# Patient Record
Sex: FEMALE | Race: WHITE
[De-identification: names, ages, dates, MRNs, and addresses within clinical notes are randomized per-mention and may not be internally consistent; named-entity substitution may affect disease eponyms.]

---

## 2018-06-13 ENCOUNTER — HOSPITAL ENCOUNTER (EMERGENCY)
Dept: HOSPITAL 58 - ED | Age: 58
LOS: 1 days | Discharge: HOME | End: 2018-06-14

## 2018-06-13 VITALS — BODY MASS INDEX: 19.5 KG/M2

## 2018-06-13 DIAGNOSIS — R10.84: ICD-10-CM

## 2018-06-13 DIAGNOSIS — K59.00: Primary | ICD-10-CM

## 2018-06-13 DIAGNOSIS — F17.210: ICD-10-CM

## 2018-06-13 PROCEDURE — 82550 ASSAY OF CK (CPK): CPT

## 2018-06-13 PROCEDURE — 85025 COMPLETE CBC W/AUTO DIFF WBC: CPT

## 2018-06-13 PROCEDURE — 83690 ASSAY OF LIPASE: CPT

## 2018-06-13 PROCEDURE — 81001 URINALYSIS AUTO W/SCOPE: CPT

## 2018-06-13 PROCEDURE — 87086 URINE CULTURE/COLONY COUNT: CPT

## 2018-06-13 PROCEDURE — 80053 COMPREHEN METABOLIC PANEL: CPT

## 2018-06-13 PROCEDURE — 93010 ELECTROCARDIOGRAM REPORT: CPT

## 2018-06-13 PROCEDURE — 99283 EMERGENCY DEPT VISIT LOW MDM: CPT

## 2018-06-13 PROCEDURE — 84484 ASSAY OF TROPONIN QUANT: CPT

## 2018-06-13 PROCEDURE — 96375 TX/PRO/DX INJ NEW DRUG ADDON: CPT

## 2018-06-13 PROCEDURE — 85651 RBC SED RATE NONAUTOMATED: CPT

## 2018-06-13 PROCEDURE — 93005 ELECTROCARDIOGRAM TRACING: CPT

## 2018-06-13 PROCEDURE — 96374 THER/PROPH/DIAG INJ IV PUSH: CPT

## 2018-06-13 PROCEDURE — 36415 COLL VENOUS BLD VENIPUNCTURE: CPT

## 2018-06-13 PROCEDURE — 82150 ASSAY OF AMYLASE: CPT

## 2018-06-13 RX ADMIN — Medication PRN SYR: at 23:52

## 2018-06-14 VITALS — SYSTOLIC BLOOD PRESSURE: 194 MMHG | DIASTOLIC BLOOD PRESSURE: 74 MMHG | TEMPERATURE: 97.6 F

## 2018-06-14 RX ADMIN — Medication PRN SYR: at 00:05

## 2018-06-14 RX ADMIN — Medication PRN SYR: at 00:06

## 2018-06-14 NOTE — CT
EXAM:  CT scan abdomen pelvis without contrast 

  

HISTORY:  Abdominal pain 

  

COMPARISON:  CT scan abdomen pelvis with runoff 01/28/2014 

  

FINDINGS:  Contiguous axial images obtained from lung bases to the symphysis pubis without contrast u
tilizing 3-mm collimation.  Sagittal and coronal reconstructions were imaged and reviewed. Calcified 
granuloma at the left lung base.  The gallbladder is fluid filled without cholelithiasis.  Benign gra
nulomatous changes are seen within the liver and spleen.  Extensive atherosclerotic changes are seen 
involving the abdominal aorta.  There  is an aortobifemoral graft.  There is also a fem-fem graft. Th
ere are nonobstructive punctate renal calculi.  There is colonic fecal stasis without obstruction .. 
 There is anterolisthesis of L4/L5 with marked degenerate disc disease and endplate degenerative carranza
ges. 

  

IMPRESSION: 

  

Nonobstructive bilateral nephrolithiasis. 

  

Colonic fecal stasis without obstruction or free fluid. 

  

ASVD.

## 2018-06-14 NOTE — ED.PDOC
General


ED Provider: 


Dr. LAYO BONILLA-ER





Chief Complaint: Abdominal Pain


Stated Complaint: im hurting--i think i have constipation


Time Seen by Physician: 23:30


Mode of Arrival: Wheelchair


Information Source: Patient, Family


Exam Limitations: No limitations


Primary Care Provider: 


MIREYA FUNK





Nursing and Triage Documentation Reviewed and Agree: Yes


Reviewed sepsis parameters & appropriate labs ordered?: Yes


System Inflammatory Response Syndrome: Not Applicable


Sepsis Protocol: 


For patient's 13 years and over:





Temp is 96.8 and below  and greater


Pulse >90 BPM


Resp >20/minute


Acutely Altered Mental Status





Are patient's symptoms suggestive of a new infection, such as:


   -Pneumonia


   -Skin, Soft Tissue


   -Endocarditis


   -UTI


   -Bone, Joint Infection


   -Implantable Device


   -Acute Abdominal Infection


   -Wound Infection


   -Meningitis


   -Blood Stream Catheter Infection


   -Unknown








GI Complaint Exam





- Abdominal Pain Complaint/Exam


Onset: Gradual


Duration: 24 hrs


Symptoms Are: Still present


Timing: Constant


Initial Severity: Mild


Current Severity: Mild


Location of Pain: Diffuse


Character: Reports: Dull, Aching, Cramping


Alleviating: Reports: None


Associated Signs and Symptoms: Reports: Constipation


Abdominal Findings: Present: None


Differential Diagnoses: Bowel Obstruction, Constipation, Pancreatitis


Quality Indicator For Non-Traumatic Chest Pain/Syncope: EKG Performed





Review of Systems





- Review Of Systems


Constitutional: Reports: No symptoms


Eyes: Reports: No symptoms


Ears, Nose, Mouth, Throat: Reports: No symptoms


Respiratory: Reports: No symptoms


Cardiac: Reports: No symptoms


GI: Reports: Abdominal pain, Constipated


: Reports: No symptoms


Musculoskeletal: Reports: No symptoms


Skin: Reports: No symptoms


Neurological: Reports: No symptoms


Endocrine: Reports: No symptoms


Hematologic/Lymphatic: Reports: No symptoms


All Other Systems: Reviewed and Negative





Past Medical History





- Past Medical History


Previously Healthy: Yes


Endocrine: Reports: Unknown


Cardiovascular: Reports: Unknown


Respiratory: Reports: Unknown


Hematological: Reports: Unknown


Gastrointestinal: Reports: Unknown


Genitourinary: Reports: Unknown


Neuro/Psych: Reports: Unknown


Musculoskeletal: Reports: Unknown


Cancer: Reports: Unknown


Last Menstrual Period: na





- Surgical History


General Surgical History: Reports: Unknown





- Family History


Family History: Reports: Unknown





- Social History


Smoking Status: Current every day smoker, Light tobacco smoker


Hx Substance Use: No


Alcohol Screening: None


Lives: With family





- Immunizations


Tetanus Shot up to Date: Yes





Physical Exam





- Physical Exam


Appearance: Well-appearing, No pain distress, Well-nourished


Eyes: AAKASH, EOMI, Conjunctiva clear


ENT: Ears normal, Nose normal, Oropharynx normal


Neck: Supple


Respiratory: Airway patent, Breath sounds clear, Breath sounds equal, 

Respirations nonlabored


Cardiovascular: RRR, Pulses normal, No rub, No murmur


GI/: Soft, Nontender, No masses, Bowel sounds normal, No Organomegaly, Tender


Musculoskeletal: Normal strength, ROM intact, No edema, No calf tenderness


Skin: Warm


Neurological: Sensation intact


Psychiatric: Affect appropriate, Mood appropriate





Interpretation





- Radiology Interpretation


Radiology Interpretation By: Radiologist


Radiology Results: Negative


Exam Interpreted: CT Scan ("COLONIC FECAL STASIS WITHOUT OBSTRUCTION")





Critical Care Note





- Critical Care Note


Total Time (mins): 0





Course





- Course


Hematology/Chemistry: 


 06/13/18 23:50





 06/13/18 23:50


Orders, Labs, Meds: 


Lab Review











  06/13/18 06/13/18 06/13/18





  23:50 23:50 23:50


 


WBC  14.35 H  


 


RBC  4.33  


 


Hgb  13.6  


 


Hct  40.0  


 


MCV  92.4  


 


MCH  31.4 H  


 


MCHC  34.0  


 


RDW Coeff of Kaylan  12.6  


 


Plt Count  240  


 


Immature Gran % (Auto)  0.4  


 


Neut % (Auto)  75.8  


 


Lymph % (Auto)  20.2  


 


Mono % (Auto)  3.3  


 


Eos % (Auto)  0.0  


 


Baso % (Auto)  0.3  


 


Immature Gran # (Auto)  0.1  


 


Neut # (Auto)  10.9 H  


 


Lymph # (Auto)  2.9  


 


Mono # (Auto)  0.5  


 


Eos # (Auto)  0.0  


 


Baso # (Auto)  0.1  


 


ESR   16 


 


Sodium    139


 


Potassium    3.9


 


Chloride    106


 


Carbon Dioxide    20 L


 


Anion Gap    16.9


 


BUN    12


 


Creatinine    0.60


 


Estimated GFR (MDRD)    103.00


 


BUN/Creatinine Ratio    20.00


 


Glucose    129 H


 


Calcium    9.3


 


Total Bilirubin    0.4


 


AST    15


 


ALT    13


 


Alkaline Phosphatase    69


 


Total Creatine Kinase    74


 


Troponin I    0.0100


 


Total Protein    7.8


 


Albumin    3.6


 


Globulin    4.2


 


Albumin/Globulin Ratio    0.86


 


Amylase    87


 


Lipase    12








Orders











 Category Date Time Status


 


 EKG-(ED ONLY) Stat CARDIO  06/13/18 23:25 Ordered


 


 IV [ED IV/MEDIPORT/POWERPORT] .ONCE EMERGENCY  06/13/18 23:25 Active


 


 AMYLASE Stat LAB  06/13/18 23:50 Completed


 


 CBC W/ AUTO DIFF Stat LAB  06/13/18 23:50 Completed


 


 COMPREHENSIVE METABOLIC PANEL Stat LAB  06/13/18 23:50 Completed


 


 CREATINE KINASE Stat LAB  06/13/18 23:50 Completed


 


 ESR Stat LAB  06/13/18 23:50 Completed


 


 LIPASE Stat LAB  06/13/18 23:50 Completed


 


 TROPONIN I Stat LAB  06/13/18 23:50 Completed


 


 URINALYSIS C & S IF INDICATED Stat LAB  06/14/18 01:02 Ordered


 


 0.9 % Sodium Chloride [Saline Flush] MEDS  06/13/18 23:25 Ordered





 1 syr IVF PRN PRN   


 


 Morphine Sulfate [Morphine 2 mg/ml Syringe] MEDS  06/13/18 23:26 Discontinued





 2 mg IVP ONCE STA   


 


 Ondansetron HCl/Pf [Zofran 4 mg/2 ml] MEDS  06/13/18 23:26 Discontinued





 4 mg IVP ONCE STA   


 


 CT ABDOMEN/PELVIS WO CONTRAST Stat RADS  06/13/18 23:25 Completed








Medications











Generic Name Dose Route Start Last Admin





  Trade Name Freq  PRN Reason Stop Dose Admin


 


Sodium Chloride  1 syr  06/13/18 23:25  06/14/18 00:06





  Saline Flush  IVF   1 syr





  PRN PRN   Administration





  To flush IV   





     





     





     














Discontinued Medications














Generic Name Dose Route Start Last Admin





  Trade Name Freq  PRN Reason Stop Dose Admin


 


Morphine Sulfate  2 mg  06/13/18 23:26  06/13/18 23:55





  Morphine 2 Mg/Ml Syringe  IVP  06/13/18 23:27  2 mg





  ONCE STA   Administration





     





     





     





     


 


Ondansetron HCl  4 mg  06/13/18 23:26  06/13/18 23:50





  Zofran 4 Mg/2 Ml  IVP  06/13/18 23:27  4 mg





  ONCE STA   Administration





     





     





     





     











Vital Signs: 


 











  Temp Pulse Resp BP Pulse Ox


 


 06/13/18 23:28  97.6 F  81  20  194/74 H  96














Departure





- Departure


Time of Disposition: 01:15


Disposition: HOME SELF-CARE


Discharge Problem: 


Constipation


Qualifiers:


 Constipation type: unspecified constipation type Qualified Code(s): K59.00 - 

Constipation, unspecified





Instructions:  Constipation (ED), Obstipation (ED)


Condition: Good


Pt referred to PMD for follow-up: Yes


IPMP verified?: No


Additional Instructions: 


USE MIRALAX BID --F/U WITH PCP


Allergies/Adverse Reactions: 


Allergies





aspirin Adverse Reaction (Verified 06/14/18 00:10)


 


 hurts stomach 








Home Medications: 


Ambulatory Orders





1 [No Reported Medications]  06/14/18 








Disposition Discussed With: Patient